# Patient Record
Sex: FEMALE | URBAN - METROPOLITAN AREA
[De-identification: names, ages, dates, MRNs, and addresses within clinical notes are randomized per-mention and may not be internally consistent; named-entity substitution may affect disease eponyms.]

---

## 2021-10-28 ENCOUNTER — APPOINTMENT (RX ONLY)
Dept: URBAN - METROPOLITAN AREA CLINIC 43 | Facility: CLINIC | Age: 51
Setting detail: DERMATOLOGY
End: 2021-10-28

## 2021-10-28 DIAGNOSIS — L259 CONTACT DERMATITIS AND OTHER ECZEMA, UNSPECIFIED CAUSE: ICD-10-CM

## 2021-10-28 PROBLEM — L23.9 ALLERGIC CONTACT DERMATITIS, UNSPECIFIED CAUSE: Status: ACTIVE | Noted: 2021-10-28

## 2021-10-28 PROCEDURE — ? DIAGNOSIS COMMENT

## 2021-10-28 PROCEDURE — 99203 OFFICE O/P NEW LOW 30 MIN: CPT

## 2021-10-28 PROCEDURE — ? PRESCRIPTION MEDICATION MANAGEMENT

## 2021-10-28 PROCEDURE — ? PRESCRIPTION

## 2021-10-28 PROCEDURE — ? COUNSELING

## 2021-10-28 RX ORDER — CLOBETASOL PROPIONATE 0.5 MG/G
OINTMENT TOPICAL
Qty: 60 | Refills: 2 | Status: ERX | COMMUNITY
Start: 2021-10-28

## 2021-10-28 RX ADMIN — CLOBETASOL PROPIONATE: 0.5 OINTMENT TOPICAL at 00:00

## 2021-10-28 ASSESSMENT — LOCATION DETAILED DESCRIPTION DERM: LOCATION DETAILED: RIGHT DISTAL DORSAL RING FINGER

## 2021-10-28 ASSESSMENT — LOCATION ZONE DERM: LOCATION ZONE: FINGER

## 2021-10-28 ASSESSMENT — LOCATION SIMPLE DESCRIPTION DERM: LOCATION SIMPLE: RIGHT RING FINGER

## 2021-10-28 NOTE — PROCEDURE: PRESCRIPTION MEDICATION MANAGEMENT
Render In Strict Bullet Format?: No
Initiate Treatment: Clobetasol ointment BID for up to 2 weeks, and may use for flares
Detail Level: Zone

## 2021-10-28 NOTE — PROCEDURE: MIPS QUALITY
Detail Level: Detailed
Quality 226: Preventive Care And Screening: Tobacco Use: Screening And Cessation Intervention: Patient screened for tobacco use and is an ex/non-smoker
Quality 130: Documentation Of Current Medications In The Medical Record: Current Medications Documented
Quality 431: Preventive Care And Screening: Unhealthy Alcohol Use - Screening: Patient not identified as an unhealthy alcohol user when screened for unhealthy alcohol use using a systematic screening method
Quality 402: Tobacco Use And Help With Quitting Among Adolescents: Patient screened for tobacco and never smoked

## 2021-10-28 NOTE — PROCEDURE: DIAGNOSIS COMMENT
Detail Level: Simple
Render Risk Assessment In Note?: no
Comment: Pt has lightbox at home but interested in restarting in office
Comment: Advised to use Gauze instead of band aid

## 2022-04-05 ENCOUNTER — APPOINTMENT (RX ONLY)
Dept: URBAN - METROPOLITAN AREA CLINIC 43 | Facility: CLINIC | Age: 52
Setting detail: DERMATOLOGY
End: 2022-04-05

## 2022-04-05 PROBLEM — M13.10: Status: ACTIVE | Noted: 2022-04-05

## 2022-04-05 PROCEDURE — 99212 OFFICE O/P EST SF 10 MIN: CPT

## 2022-04-05 PROCEDURE — ? ADDITIONAL NOTES

## 2022-04-05 PROCEDURE — ? PRESCRIPTION

## 2022-04-05 PROCEDURE — ? COUNSELING

## 2022-04-05 RX ORDER — COLCHICINE 0.6 MG/1
TABLET, FILM COATED ORAL
Qty: 28 | Refills: 0 | Status: ERX | COMMUNITY
Start: 2022-04-05

## 2022-04-05 RX ADMIN — COLCHICINE: 0.6 TABLET, FILM COATED ORAL at 00:00

## 2022-04-05 NOTE — PROCEDURE: ADDITIONAL NOTES
Render Risk Assessment In Note?: no
Additional Notes: Finger tip and distal joint swollen, red, painful\\nPt cannot bend finger\\nHx of trauma in area, ~15 years ago injured hand in fence \\nX-rays done with ortho, reports no bone involvement, soft tissue inflammation\\nHx of dishydrotic eczema\\nHas been using Clobetasol ointment, Aquaphor, and washing with baby shampoo\\n\\nDiscussed possible diagnoses at length\\nLikely internal inflammation of the joint \\nStart ibuprofen 800 mg every 8 hours\\nColchicine 0.6 mg tab bid x 2 weeks\\n\\nF/u 2-3 weeks\\nConsider oral steroids or refer to rheumatologist
Detail Level: Simple

## 2024-08-21 ENCOUNTER — APPOINTMENT (RX ONLY)
Dept: URBAN - METROPOLITAN AREA CLINIC 43 | Facility: CLINIC | Age: 54
Setting detail: DERMATOLOGY
End: 2024-08-21

## 2024-08-21 DIAGNOSIS — L65.0 TELOGEN EFFLUVIUM: ICD-10-CM

## 2024-08-21 DIAGNOSIS — D69.2 OTHER NONTHROMBOCYTOPENIC PURPURA: ICD-10-CM

## 2024-08-21 PROCEDURE — 99213 OFFICE O/P EST LOW 20 MIN: CPT

## 2024-08-21 PROCEDURE — ? ADDITIONAL NOTES

## 2024-08-21 PROCEDURE — ? PRESCRIPTION

## 2024-08-21 PROCEDURE — ? COUNSELING

## 2024-08-21 RX ORDER — MINOXIDIL 2.5 MG/1
TABLET ORAL
Qty: 30 | Refills: 1 | Status: ERX | COMMUNITY
Start: 2024-08-21

## 2024-08-21 RX ADMIN — MINOXIDIL: 2.5 TABLET ORAL at 00:00

## 2024-08-21 ASSESSMENT — LOCATION DETAILED DESCRIPTION DERM
LOCATION DETAILED: LEFT DISTAL DORSAL FOREARM
LOCATION DETAILED: POSTERIOR MID-PARIETAL SCALP

## 2024-08-21 ASSESSMENT — LOCATION SIMPLE DESCRIPTION DERM
LOCATION SIMPLE: LEFT FOREARM
LOCATION SIMPLE: POSTERIOR SCALP

## 2024-08-21 ASSESSMENT — LOCATION ZONE DERM
LOCATION ZONE: SCALP
LOCATION ZONE: ARM

## 2024-08-21 NOTE — PROCEDURE: ADDITIONAL NOTES
Render Risk Assessment In Note?: no
Additional Notes: Increased shedding and overall thinness since January 2024\\nCOVID infection in 2022, total hip replacement February 2024\\nIdentical twin sister also experiencing hair loss\\nRecent BW has been wnl\\nPt reported increased stressors \\nHas tried otc oil, Rogaine, and Spironolactone\\nSpironolactone was d/c due to increase in potassium\\n\\nDiscussed RBSE and proper use of topical minoxidil \\nDiscussed RBSE and proper use of oral minoxidil \\nAnswered all questions\\nRecommended high protein diet, daily multivitamin, stress management, and gentle scalp care\\nWill begin oral minoxidil 1.25mg QD\\nF/u in 3-4 months
Detail Level: Simple

## 2024-08-21 NOTE — HPI: HAIR LOSS
How Did The Hair Loss Occur?: sudden in onset
Additional History: Possible Fhx of hair loss, maternal aunt. Has tried otc oil and Rogaine, as well as red light caps. Recent bloodwork has been WNL. Was on Spironolactone but d/c due to potassium increase. Currently taking multivitamin. Has had hysterectomy, but ovaries were kept.

## 2025-01-08 ENCOUNTER — RX ONLY (RX ONLY)
Age: 55
End: 2025-01-08

## 2025-01-08 RX ORDER — MINOXIDIL 2.5 MG/1
TABLET ORAL
Qty: 30 | Refills: 0 | Status: ERX